# Patient Record
Sex: MALE | URBAN - METROPOLITAN AREA
[De-identification: names, ages, dates, MRNs, and addresses within clinical notes are randomized per-mention and may not be internally consistent; named-entity substitution may affect disease eponyms.]

---

## 2020-06-19 ENCOUNTER — NEW PATIENT (OUTPATIENT)
Dept: URBAN - METROPOLITAN AREA CLINIC 85 | Facility: CLINIC | Age: 70
End: 2020-06-19
Payer: MEDICARE

## 2020-06-19 DIAGNOSIS — H25.21 AGE-RELATED CATARACT, MORGANIAN TYPE, RIGHT EYE: Primary | ICD-10-CM

## 2020-06-19 DIAGNOSIS — H25.12 AGE-RELATED NUCLEAR CATARACT, LEFT EYE: ICD-10-CM

## 2020-06-19 PROCEDURE — 92134 CPTRZ OPH DX IMG PST SGM RTA: CPT | Performed by: OPHTHALMOLOGY

## 2020-06-19 PROCEDURE — 92004 COMPRE OPH EXAM NEW PT 1/>: CPT | Performed by: OPHTHALMOLOGY

## 2020-06-19 RX ORDER — KETOROLAC TROMETHAMINE 5 MG/ML
0.5 % SOLUTION OPHTHALMIC
Qty: 1 | Refills: 1 | Status: INACTIVE
Start: 2020-06-19 | End: 2020-08-18

## 2020-06-19 ASSESSMENT — VISUAL ACUITY
OS: 20/25
OD: 20/150

## 2020-06-19 ASSESSMENT — INTRAOCULAR PRESSURE
OS: 15
OD: 13

## 2020-06-19 ASSESSMENT — KERATOMETRY
OD: 42.50
OS: 42.50

## 2020-06-22 ENCOUNTER — Encounter (OUTPATIENT)
Dept: URBAN - METROPOLITAN AREA CLINIC 85 | Facility: CLINIC | Age: 70
End: 2020-06-22
Payer: MEDICARE

## 2020-06-25 ENCOUNTER — Encounter (OUTPATIENT)
Dept: URBAN - METROPOLITAN AREA CLINIC 85 | Facility: CLINIC | Age: 70
End: 2020-06-25
Payer: MEDICARE

## 2020-06-25 DIAGNOSIS — H25.13 AGE-RELATED NUCLEAR CATARACT, BILATERAL: ICD-10-CM

## 2020-06-25 DIAGNOSIS — Z01.818 ENCOUNTER FOR OTHER PREPROCEDURAL EXAMINATION: Primary | ICD-10-CM

## 2020-06-25 PROCEDURE — 99213 OFFICE O/P EST LOW 20 MIN: CPT | Performed by: PHYSICIAN ASSISTANT

## 2020-07-01 ENCOUNTER — SURGERY (OUTPATIENT)
Dept: URBAN - METROPOLITAN AREA SURGERY 55 | Facility: SURGERY | Age: 70
End: 2020-07-01
Payer: MEDICARE

## 2020-07-15 ENCOUNTER — SURGERY (OUTPATIENT)
Dept: URBAN - METROPOLITAN AREA SURGERY 55 | Facility: SURGERY | Age: 70
End: 2020-07-15
Payer: MEDICARE

## 2020-08-18 ENCOUNTER — FOLLOW UP ESTABLISHED (OUTPATIENT)
Dept: URBAN - METROPOLITAN AREA CLINIC 85 | Facility: CLINIC | Age: 70
End: 2020-08-18
Payer: MEDICARE

## 2020-08-18 DIAGNOSIS — H26.493 OTHER SECONDARY CATARACT, BILATERAL: ICD-10-CM

## 2020-08-18 PROCEDURE — 92012 INTRM OPH EXAM EST PATIENT: CPT | Performed by: OPHTHALMOLOGY

## 2020-08-18 ASSESSMENT — INTRAOCULAR PRESSURE
OD: 11
OS: 12

## 2020-08-28 ENCOUNTER — FOLLOW UP ESTABLISHED (OUTPATIENT)
Dept: URBAN - METROPOLITAN AREA CLINIC 85 | Facility: CLINIC | Age: 70
End: 2020-08-28
Payer: MEDICARE

## 2020-08-28 PROCEDURE — 64612 DESTROY NERVE FACE MUSCLE: CPT | Performed by: OPHTHALMOLOGY

## 2020-11-20 ENCOUNTER — FOLLOW UP ESTABLISHED (OUTPATIENT)
Dept: URBAN - METROPOLITAN AREA CLINIC 85 | Facility: CLINIC | Age: 70
End: 2020-11-20
Payer: MEDICARE

## 2020-11-20 DIAGNOSIS — G51.8 OTHER DISORDERS OF FACIAL NERVE: Primary | ICD-10-CM

## 2020-11-20 DIAGNOSIS — G51.31 CLONIC HEMIFACIAL SPASM, RIGHT: ICD-10-CM

## 2020-11-20 PROCEDURE — 64612 DESTROY NERVE FACE MUSCLE: CPT | Performed by: OPHTHALMOLOGY

## 2022-06-02 ENCOUNTER — OFFICE VISIT (OUTPATIENT)
Dept: URBAN - METROPOLITAN AREA CLINIC 85 | Facility: CLINIC | Age: 72
End: 2022-06-02
Payer: MEDICARE

## 2022-06-02 DIAGNOSIS — G24.5 BLEPHAROSPASM: Primary | ICD-10-CM

## 2022-06-02 DIAGNOSIS — H26.493 OTHER SECONDARY CATARACT, BILATERAL: ICD-10-CM

## 2022-06-02 PROCEDURE — 92014 COMPRE OPH EXAM EST PT 1/>: CPT | Performed by: OPHTHALMOLOGY

## 2022-06-02 ASSESSMENT — VISUAL ACUITY
OD: 20/20
OS: 20/20

## 2022-06-02 ASSESSMENT — INTRAOCULAR PRESSURE
OS: 13
OD: 12

## 2022-06-02 NOTE — IMPRESSION/PLAN
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 Plan: Discussed findings with patient and option of Botox injections for treatment of facial spasms. Patient elects to proceed with Botox injections.

## 2022-06-03 ENCOUNTER — OFFICE VISIT (OUTPATIENT)
Dept: URBAN - METROPOLITAN AREA CLINIC 85 | Facility: CLINIC | Age: 72
End: 2022-06-03
Payer: MEDICARE

## 2022-06-03 DIAGNOSIS — G51.8 OTHER DISORDERS OF FACIAL NERVE: Primary | ICD-10-CM

## 2022-06-03 PROCEDURE — 64612 DESTROY NERVE FACE MUSCLE: CPT | Performed by: OPHTHALMOLOGY

## 2022-06-03 NOTE — IMPRESSION/PLAN
Impression: hemifacial spasm Plan: Spasms interfering with reading and driving. injectedin pattern indicated in chart, 19  units used. Follow up 3 months for ongoing management.

## 2022-09-02 ENCOUNTER — PROCEDURE (OUTPATIENT)
Dept: URBAN - METROPOLITAN AREA CLINIC 85 | Facility: CLINIC | Age: 72
End: 2022-09-02
Payer: MEDICARE

## 2022-09-02 DIAGNOSIS — G51.31 CLONIC HEMIFACIAL SPASM, RIGHT: ICD-10-CM

## 2022-09-02 DIAGNOSIS — G51.8 OTHER DISORDERS OF FACIAL NERVE: Primary | ICD-10-CM

## 2022-09-02 PROCEDURE — 64612 DESTROY NERVE FACE MUSCLE: CPT | Performed by: OPHTHALMOLOGY

## 2022-12-23 ENCOUNTER — OFFICE VISIT (OUTPATIENT)
Dept: URBAN - METROPOLITAN AREA CLINIC 85 | Facility: CLINIC | Age: 72
End: 2022-12-23
Payer: MEDICARE

## 2022-12-23 DIAGNOSIS — G51.8 OTHER DISORDERS OF FACIAL NERVE: ICD-10-CM

## 2022-12-23 DIAGNOSIS — G24.5 BLEPHAROSPASM: ICD-10-CM

## 2022-12-23 DIAGNOSIS — G51.31 CLONIC HEMIFACIAL SPASM, RIGHT: Primary | ICD-10-CM

## 2022-12-23 PROCEDURE — 64612 DESTROY NERVE FACE MUSCLE: CPT | Performed by: OPHTHALMOLOGY

## 2023-03-24 ENCOUNTER — OFFICE VISIT (OUTPATIENT)
Dept: URBAN - METROPOLITAN AREA CLINIC 85 | Facility: CLINIC | Age: 73
End: 2023-03-24
Payer: MEDICARE

## 2023-03-24 DIAGNOSIS — G51.31 CLONIC HEMIFACIAL SPASM, RIGHT: Primary | ICD-10-CM

## 2023-03-24 DIAGNOSIS — G51.8 OTHER DISORDERS OF FACIAL NERVE: ICD-10-CM

## 2023-03-24 PROCEDURE — 64612 DESTROY NERVE FACE MUSCLE: CPT | Performed by: OPHTHALMOLOGY

## 2023-06-30 ENCOUNTER — OFFICE VISIT (OUTPATIENT)
Dept: URBAN - METROPOLITAN AREA CLINIC 85 | Facility: CLINIC | Age: 73
End: 2023-06-30
Payer: MEDICARE

## 2023-06-30 DIAGNOSIS — G51.31 CLONIC HEMIFACIAL SPASM, RIGHT: Primary | ICD-10-CM

## 2023-06-30 DIAGNOSIS — G51.8 OTHER DISORDERS OF FACIAL NERVE: ICD-10-CM

## 2023-06-30 PROCEDURE — 64612 DESTROY NERVE FACE MUSCLE: CPT | Performed by: OPHTHALMOLOGY

## 2023-06-30 NOTE — IMPRESSION/PLAN
Impression: hemifacial spasm Plan: Spasms interfering with reading and driving. injectedin pattern indicated in chart, 19  units used. Referred to [facility] for ongoing management.